# Patient Record
Sex: MALE | Race: WHITE | NOT HISPANIC OR LATINO | Employment: FULL TIME | URBAN - METROPOLITAN AREA
[De-identification: names, ages, dates, MRNs, and addresses within clinical notes are randomized per-mention and may not be internally consistent; named-entity substitution may affect disease eponyms.]

---

## 2020-03-05 ENCOUNTER — HOSPITAL ENCOUNTER (EMERGENCY)
Facility: OTHER | Age: 35
Discharge: HOME | End: 2020-03-05
Attending: EMERGENCY MEDICINE
Payer: COMMERCIAL

## 2020-03-05 VITALS
OXYGEN SATURATION: 98 % | SYSTOLIC BLOOD PRESSURE: 108 MMHG | WEIGHT: 156 LBS | DIASTOLIC BLOOD PRESSURE: 51 MMHG | BODY MASS INDEX: 20.02 KG/M2 | HEIGHT: 74 IN | RESPIRATION RATE: 17 BRPM | TEMPERATURE: 98 F | HEART RATE: 54 BPM

## 2020-03-05 DIAGNOSIS — S62.326A CLOSED DISPLACED FRACTURE OF SHAFT OF FIFTH METACARPAL BONE OF RIGHT HAND, INITIAL ENCOUNTER: Primary | ICD-10-CM

## 2020-03-05 PROCEDURE — 29125 APPL SHORT ARM SPLINT STATIC: CPT | Mod: RT

## 2020-03-05 PROCEDURE — 99283 EMERGENCY DEPT VISIT LOW MDM: CPT | Mod: 25

## 2020-03-06 ENCOUNTER — TELEPHONE (OUTPATIENT)
Dept: ORTHOPEDICS | Facility: CLINIC | Age: 35
End: 2020-03-06

## 2020-03-06 NOTE — ED PROVIDER NOTES
"Encounter Date: 3/5/2020       History     Chief Complaint   Patient presents with    Hand Pain     RIGHT hand x3 weeks- reports had xray done and has fx. States he is here todat "to get a cast and to get it fixed".      Patient is a 35-year-old male who presents to the emergency department with hand pain. Patient states he injured his right hand approximately 3 weeks ago while smashing it between sheet metal.  He states he went to urgent care after this and was told he did not have a fracture.  He states he has had persistent pain. He states he return to urgent care today who diagnosed with a fracture of his 5th metacarpal.  He was sent here for a splint.  He denies numbness.    The history is provided by the patient.     Review of patient's allergies indicates:  No Known Allergies  History reviewed. No pertinent past medical history.  Past Surgical History:   Procedure Laterality Date    APPENDECTOMY       History reviewed. No pertinent family history.  Social History     Tobacco Use    Smoking status: Not on file   Substance Use Topics    Alcohol use: Not on file    Drug use: Not on file     Review of Systems   Constitutional: Negative for chills and fever.   Musculoskeletal: Positive for arthralgias and joint swelling.   Skin: Negative for color change and wound.   Allergic/Immunologic: Negative for immunocompromised state.   Neurological: Negative for tremors and numbness.       Physical Exam     Initial Vitals   BP Pulse Resp Temp SpO2   03/05/20 1842 03/05/20 1842 03/05/20 1842 03/05/20 1842 03/05/20 1843   (!) 121/57 (!) 56 16 98.5 °F (36.9 °C) 98 %      MAP       --                Physical Exam    Constitutional: Vital signs are normal. He is cooperative. No distress.   HENT:   Head: Normocephalic and atraumatic.   Eyes: Conjunctivae and EOM are normal.   Neck: Normal range of motion. Neck supple.   Cardiovascular: Intact distal pulses.   Pulses:       Radial pulses are 2+ on the right side. "   Pulmonary/Chest: No respiratory distress.   Musculoskeletal:   Tenderness along the 5th metacarpal with swelling to the base of the 5th metacarpal on the right hands.  Range of motion is preserved aside from slightly decreased movement to right 5th digit secondary to pain. No signs of tendon injury to the right hand.   Neurological: He is alert and oriented to person, place, and time. GCS eye subscore is 4. GCS verbal subscore is 5. GCS motor subscore is 6.   No signs of nerve injury to the right hand.   Skin: Skin is warm and dry. No rash noted.         ED Course   Splint Application  Date/Time: 3/5/2020 9:51 PM  Performed by: Zach Dick PA-C  Authorized by: Elly Gill MD   Location details: right hand  Splint type: ulnar gutter  Supplies used: Ortho-Glass  Post-procedure: The splinted body part was neurovascularly unchanged following the procedure.  Patient tolerance: Patient tolerated the procedure well with no immediate complications        Labs Reviewed - No data to display       Imaging Results          X-Ray Hand 3 view Right (Edited Result - FINAL)  Result time 03/05/20 21:36:18    Addendum 1 of 1 by Sandy Payan MD (03/05/20 21:36:18)      In the body of the report, dislocated should be changed to displaced.      Electronically signed by: Sandy Payan  Date:    03/05/2020  Time:    21:36               Final result by Sandy Payan MD (03/05/20 21:01:35)                 Impression:      Acute traumatic displaced fracture of the right 5th metacarpal.      Electronically signed by: Sandy Payan  Date:    03/05/2020  Time:    21:01             Narrative:    EXAMINATION:  THREE VIEWS OF THE RIGHT HAND    CLINICAL HISTORY:  hand injury;    TECHNIQUE:  AP, lateral, and oblique views of the right hand    COMPARISON:  None.    FINDINGS:  Three views of the right hand demonstrate acute fracture involving the head and distal shaft and neck of the 5th metacarpal.  The distal fragment  is dislocated laterally.                                 Medical Decision Making:   Initial Assessment:   Urgent evaluation of a 35 y.o. male presenting to the emergency department complaining of right hand injury 3 weeks ago. Patient is afebrile, nontoxic appearing and hemodynamically stable.  -limb is distally neurovascular intact. Suspect boxer fracture.  ED Management:  Hand x-ray reveals a fracture involving the head and distal shaft and neck of the 5th metacarpal which, distal fragment is displaced.  -patient was placed in ulnar gutter splint.  He is given information to follow up with hand surgery.  He had no other complaints today and was stable at discharge.    Other:   I have discussed this case with another health care provider.                                 Clinical Impression:     1. Closed displaced fracture of shaft of fifth metacarpal bone of right hand, initial encounter            ED Disposition Condition    Discharge Stable        ED Prescriptions     None        Follow-up Information     Follow up With Specialties Details Why Contact Info Additional Information    [facility], [place], [facility] by [facility] [address] Orthopedics Schedule an appointment as soon as possible for a visit   [address] [facility] [address], [address]  [facility] [id number] [phone] Hand [facility] - [facility], 9th Floor, [address] [place]    [facility] Orthopedic Surgery Schedule an appointment as soon as possible for a visit   [address] [facility] [address]  [phone]                                        Zach Dick PA-C  03/05/20 3287

## 2020-03-06 NOTE — TELEPHONE ENCOUNTER
Called patient in regard to phone message about an appointment. Offered him an appt with Tiara Pardo on 3/10/2020 at 8 am and he agreed to come in at that time. He verbalized understanding of time and location.

## 2020-03-06 NOTE — TELEPHONE ENCOUNTER
----- Message from Bhavani Mohan sent at 3/6/2020 10:50 AM CST -----  Contact: pt  Name of Who is Calling: Gene Warner      What is the request in detail: pt was see in ED for a broken finger. Pt would like to schedule an appointment to be seen in clinic. Please contact to further discuss and advise.       Can the clinic reply by [facility]: n       What Number to Call Back [facility] not [facility]: [phone]

## 2020-03-10 ENCOUNTER — OFFICE VISIT (OUTPATIENT)
Dept: ORTHOPEDICS | Facility: CLINIC | Age: 35
End: 2020-03-10
Payer: COMMERCIAL

## 2020-03-10 VITALS
HEART RATE: 56 BPM | SYSTOLIC BLOOD PRESSURE: 103 MMHG | DIASTOLIC BLOOD PRESSURE: 62 MMHG | HEIGHT: 74 IN | WEIGHT: 156 LBS | BODY MASS INDEX: 20.02 KG/M2

## 2020-03-10 DIAGNOSIS — S62.336A CLOSED DISPLACED FRACTURE OF NECK OF FIFTH METACARPAL BONE OF RIGHT HAND, INITIAL ENCOUNTER: Primary | ICD-10-CM

## 2020-03-10 PROCEDURE — 3008F PR BODY MASS INDEX (BMI) DOCUMENTED: ICD-10-PCS | Mod: CPTII,S$GLB,, | Performed by: PHYSICIAN ASSISTANT

## 2020-03-10 PROCEDURE — 3008F BODY MASS INDEX DOCD: CPT | Mod: CPTII,S$GLB,, | Performed by: PHYSICIAN ASSISTANT

## 2020-03-10 PROCEDURE — 99999 PR PBB SHADOW E&M-EST. PATIENT-LVL III: CPT | Mod: PBBFAC,,, | Performed by: PHYSICIAN ASSISTANT

## 2020-03-10 PROCEDURE — 99203 PR OFFICE/OUTPT VISIT, NEW, LEVL III, 30-44 MIN: ICD-10-PCS | Mod: 25,S$GLB,, | Performed by: PHYSICIAN ASSISTANT

## 2020-03-10 PROCEDURE — 99203 OFFICE O/P NEW LOW 30 MIN: CPT | Mod: 25,S$GLB,, | Performed by: PHYSICIAN ASSISTANT

## 2020-03-10 PROCEDURE — 99999 PR PBB SHADOW E&M-EST. PATIENT-LVL III: ICD-10-PCS | Mod: PBBFAC,,, | Performed by: PHYSICIAN ASSISTANT

## 2020-03-10 PROCEDURE — 29075 PR APPLY FOREARM CAST: ICD-10-PCS | Mod: RT,S$GLB,, | Performed by: PHYSICIAN ASSISTANT

## 2020-03-10 PROCEDURE — 29075 APPL CST ELBW FNGR SHORT ARM: CPT | Mod: RT,S$GLB,, | Performed by: PHYSICIAN ASSISTANT

## 2020-03-10 NOTE — PROGRESS NOTES
"Subjective:      Patient ID: Gene Warner is a 35 y.o. male.    Chief Complaint: Pain of the Right Hand      HPI  Gene Warner is an ambidextrous 35 y.o. male presenting today for evaluation right 5th metacarpal fracture.  There was a history of trauma, reports that he had the right hand smashed against a corner piece of sheet metal.  Injury occurred 3 weeks ago.  He presented to the ED on 3/5/2020, reports that he had gone to [facility] [address] after the injury on 2/18/2020 and underwent x-ray evaluation.  Per the patient he was given a soft wrap and sent home without the diagnosis of a fracture.  He reports that he continue to use the hand for the next 2 weeks.  He returned to urgent care on 3/5/2020, had repeat x-ray and was diagnosed with a 5th metacarpal fracture, and then went to the ED for splinting.  He reports decreased motion in the right small finger compared to left.  He also reports mild pain in the right hand.  He denies any finger numbness or tingling.  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       Review of patient's allergies indicates:  No Known Allergies      No current outpatient medications on file.     No current facility-administered medications for this visit.        History reviewed. No pertinent past medical history.    Past Surgical History:   Procedure Laterality Date    APPENDECTOMY           Review of Systems:  Review of Systems   Constitution: Negative for chills and fever.   Skin: Negative for rash and suspicious lesions.   Musculoskeletal:        See HPI   Neurological: Negative for dizziness, headaches, light-headedness, numbness and paresthesias.   Psychiatric/Behavioral: Negative for depression. The patient is not nervous/anxious.          OBJECTIVE:     PHYSICAL EXAM:  Height: 6' 2" (188 cm) Weight: 70.8 kg (156 lb)  Vitals:    03/10/20 0819   BP: 103/62   Pulse: (!) 56   Weight: 70.8 kg (156 lb)   Height: 6' 2" (1.88 m)   PainSc: " 0-No pain     General    Vitals reviewed.  Constitutional: He is oriented to person, place, and time. He appears well-developed and well-nourished.   HENT:   Head: Normocephalic and atraumatic.   Neck: Normal range of motion.   Cardiovascular: Normal rate.    Pulmonary/Chest: Effort normal. No respiratory distress.   Neurological: He is alert and oriented to person, place, and time.   Psychiatric: He has a normal mood and affect. His behavior is normal. Judgment and thought content normal.             Musculoskeletal:  No lacerations or abrasions.  No significant edema.  No ecchymosis.  Visible dirt under the fingernails bilaterally.  Mildly tender to palpation over the fracture site right 5th metacarpal head/neck.  Fair right small finger range of motion, mildly decreased due to pain.  Finger motion evaluated bilaterally, symmetrical rotation of the small finger toward the ring fingers.  No scissoring, no asymmetry.  Neurovascularly intact-good sensation and motor function, good capillary refill, 2+ radial pulses.    RADIOGRAPHS:  Right Hand XRay, 3/5/2020  FINDINGS:  Three views of the right hand demonstrate acute fracture involving the head and distal shaft and neck of the 5th metacarpal.  The distal fragment is dislocated laterally.      Impression     Acute traumatic displaced fracture of the right 5th metacarpal.     Comments: I have personally reviewed the imaging and I agree with the above radiologist's report. XRays from 3/5/2020 reviewed as well as outside XRay from 2/18/2020 which did show a 5th metacarpal fracture in similar position.    ASSESSMENT/PLAN:   Gene was seen today for pain.    Diagnoses and all orders for this visit:    Closed displaced fracture of neck of fifth metacarpal bone of right hand, initial encounter           - We talked at length about the anatomy and pathophysiology of   Encounter Diagnosis   Name Primary?    Closed displaced fracture of neck of fifth metacarpal bone of right  hand, initial encounter Yes       - x-rays reviewed with the patient, discussed that the fracture is now 3 weeks old and so has started to heal.  Discussed that CRPP would not be an option at this point, if we were to do surgical intervention it would require osteotomy with ORIF.   - x-rays and timeline reviewed with Dr. Hood, plan for conservative treatment and if necessary can revisit discussion of osteotomy and ORIF in the future if the fracture does not heal well with conservative management.  - ulnar gutter cast applied  - orders for OT, start in 3 weeks  - follow-up in 3 weeks with x-ray  - call with questions or concerns    Disclaimer: This note has been generated using voice-recognition software. There may be typographical errors that have been missed during proof-reading.

## 2020-03-23 ENCOUNTER — TELEPHONE (OUTPATIENT)
Dept: ORTHOPEDICS | Facility: CLINIC | Age: 35
End: 2020-03-23

## 2020-03-23 NOTE — TELEPHONE ENCOUNTER
----- Message from JAYLEEN Trejo sent at 3/23/2020 12:43 PM CDT -----  Regarding: cast  Please call patient - he left a message for Carly about his cast.  Sounds like we may need to add him on for a cast removal (move the 4/1 visit up to today or tomorrow  Thanks

## 2020-03-24 ENCOUNTER — OFFICE VISIT (OUTPATIENT)
Dept: ORTHOPEDICS | Facility: CLINIC | Age: 35
End: 2020-03-24
Payer: COMMERCIAL

## 2020-03-24 ENCOUNTER — HOSPITAL ENCOUNTER (OUTPATIENT)
Dept: RADIOLOGY | Facility: OTHER | Age: 35
Discharge: HOME | End: 2020-03-24
Attending: PHYSICIAN ASSISTANT
Payer: COMMERCIAL

## 2020-03-24 VITALS
DIASTOLIC BLOOD PRESSURE: 66 MMHG | BODY MASS INDEX: 20.02 KG/M2 | HEART RATE: 62 BPM | SYSTOLIC BLOOD PRESSURE: 105 MMHG | WEIGHT: 156 LBS | HEIGHT: 74 IN

## 2020-03-24 DIAGNOSIS — S62.336A CLOSED DISPLACED FRACTURE OF NECK OF FIFTH METACARPAL BONE OF RIGHT HAND, INITIAL ENCOUNTER: Primary | ICD-10-CM

## 2020-03-24 DIAGNOSIS — S62.336A CLOSED DISPLACED FRACTURE OF NECK OF FIFTH METACARPAL BONE OF RIGHT HAND, INITIAL ENCOUNTER: ICD-10-CM

## 2020-03-24 PROCEDURE — 99999 PR PBB SHADOW E&M-EST. PATIENT-LVL IV: ICD-10-PCS | Mod: PBBFAC,,, | Performed by: PHYSICIAN ASSISTANT

## 2020-03-24 PROCEDURE — 97760 ORTHOTIC MGMT&TRAING 1ST ENC: CPT | Mod: S$GLB,,, | Performed by: PHYSICIAN ASSISTANT

## 2020-03-24 PROCEDURE — 99213 PR OFFICE/OUTPT VISIT, EST, LEVL III, 20-29 MIN: ICD-10-PCS | Mod: S$GLB,,, | Performed by: PHYSICIAN ASSISTANT

## 2020-03-24 PROCEDURE — 99213 OFFICE O/P EST LOW 20 MIN: CPT | Mod: S$GLB,,, | Performed by: PHYSICIAN ASSISTANT

## 2020-03-24 PROCEDURE — 73130 X-RAY EXAM OF HAND: CPT | Mod: 26,RT,, | Performed by: RADIOLOGY

## 2020-03-24 PROCEDURE — 97760 PR ORTHOTIC MGMT&TRAINJ INITIAL ENC EA 15 MINS: ICD-10-PCS | Mod: S$GLB,,, | Performed by: PHYSICIAN ASSISTANT

## 2020-03-24 PROCEDURE — 99999 PR PBB SHADOW E&M-EST. PATIENT-LVL IV: CPT | Mod: PBBFAC,,, | Performed by: PHYSICIAN ASSISTANT

## 2020-03-24 PROCEDURE — 73130 X-RAY EXAM OF HAND: CPT | Mod: TC,FY,RT

## 2020-03-24 PROCEDURE — 3008F BODY MASS INDEX DOCD: CPT | Mod: CPTII,S$GLB,, | Performed by: PHYSICIAN ASSISTANT

## 2020-03-24 PROCEDURE — 73130 XR HAND COMPLETE 3 VIEW RIGHT: ICD-10-PCS | Mod: 26,RT,, | Performed by: RADIOLOGY

## 2020-03-24 PROCEDURE — 3008F PR BODY MASS INDEX (BMI) DOCUMENTED: ICD-10-PCS | Mod: CPTII,S$GLB,, | Performed by: PHYSICIAN ASSISTANT

## 2020-03-24 RX ORDER — TRAMADOL HYDROCHLORIDE 50 MG/1
50 TABLET ORAL EVERY 8 HOURS PRN
Qty: 21 TABLET | Refills: 0 | Status: SHIPPED | OUTPATIENT
Start: 2020-03-24

## 2020-03-24 NOTE — PATIENT INSTRUCTIONS
Full time use of the hand brace x 2 weeks  After that time the brace can come off for bathing and therapy only - continue full time use for sleep, activity, public, etc.  No lifting or weight bearing with the right hand  Start therapy in 2 weeks - call to schedule  Therapy will evaluate the hand and give you a home exercise program  Follow up in clinic with a repeat XRay in 4 weeks  Call with any questions or concerns

## 2020-03-24 NOTE — PROGRESS NOTES
"Subjective:      Patient ID: Gene Warner is a 35 y.o. male.    Chief Complaint: Injury of the Right Hand      HPI  Gene Warner is an ambidextrous 35 y.o. male presenting today for follow up of right 5th metacarpal fracture.  There was a history of trauma, reports that he had the right hand smashed against a corner piece of sheet metal.  Patient was initially seen in Hand Clinic 3 weeks after injury.  Due to the elapsed time since fracture we are treating non-operatively with immobilization. He was placed in a cast at his last visit, reports that he was using power tools over the past week and has had increased pain in the ulnar right hand since that time.  Denies weightbearing with the right hand.  Per cast tech the cast was not in good condition, it "looked like it had gotten wet." He reports moderate intermittent pain, reports that he is out of pain medication.  He presented to the ED on 3/5/2020, reports that he had gone to [facility] on [address] after the injury on 2/18/2020 and underwent x-ray evaluation.  Per the patient he was given a soft wrap and sent home without the diagnosis of a fracture.  He reports that he continued to use the hand for the next 2 weeks.  He returned to urgent care on 3/5/2020, had repeat x-ray and was diagnosed with a 5th metacarpal fracture, and then went to the ED for splinting.    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       Review of patient's allergies indicates:  No Known Allergies      Current Outpatient Medications   Medication Sig Dispense Refill    traMADoL (ULTRAM) 50 mg tablet Take 1 tablet (50 mg total) by mouth every 8 (eight) hours as needed for Pain. 21 tablet 0     No current facility-administered medications for this visit.        History reviewed. No pertinent past medical history.    Past Surgical History:   Procedure Laterality Date    APPENDECTOMY           Review of Systems:  Review of Systems   Constitution: " "Negative for chills and fever.   Skin: Negative for rash and suspicious lesions.   Musculoskeletal:        See HPI   Neurological: Negative for dizziness, headaches, light-headedness, numbness and paresthesias.   Psychiatric/Behavioral: Negative for depression. The patient is not nervous/anxious.          OBJECTIVE:     PHYSICAL EXAM:  Height: 6' 2" (188 cm) Weight: 70.8 kg (156 lb)  Vitals:    03/24/20 1331   BP: 105/66   Pulse: 62   Weight: 70.8 kg (156 lb)   Height: 6' 2" (1.88 m)   PainSc:   5     General    Vitals reviewed.  Constitutional: He is oriented to person, place, and time. He appears well-developed and well-nourished.   HENT:   Head: Normocephalic and atraumatic.   Neck: Normal range of motion.   Cardiovascular: Normal rate.    Pulmonary/Chest: Effort normal. No respiratory distress.   Neurological: He is alert and oriented to person, place, and time.   Psychiatric: He has a normal mood and affect. His behavior is normal. Judgment and thought content normal.             Musculoskeletal:  No lacerations or abrasions.  No significant edema.  No ecchymosis.  Continues to have visible dirt under the fingernails bilaterally, right small and ring finger without visible dirt.  Mildly tender to palpation over the fracture site right 5th metacarpal head/neck.  Fair right small finger range of motion, decreased due to pain.  Finger motion evaluated bilaterally, symmetrical rotation of the small finger toward the ring fingers.  No scissoring, no asymmetry.  Neurovascularly intact-good sensation and motor function, good capillary refill, 2+ radial pulses.    RADIOGRAPHS:  Right Hand XRay, 3/24/2020  FINDINGS:  There is a healing 5th metacarpal bone fracture there is mild radial displacement of the more distal fragment, unchanged from 03/05/2020.  The fracture line is still visible.  The remainder of the visualized osseous structures appear normal.  No advanced degenerative change.      Impression     Healing 5th " metacarpal bone fracture.     Comments: I have personally reviewed the imaging and I agree with the above radiologist's report. 5th metacarpal fracture in similar position.    ASSESSMENT/PLAN:   Gene was seen today for injury.    Diagnoses and all orders for this visit:    Closed displaced fracture of neck of fifth metacarpal bone of right hand, initial encounter  -     X-Ray Hand 3 View Right; Future    Other orders  -     traMADoL (ULTRAM) 50 mg tablet; Take 1 tablet (50 mg total) by mouth every 8 (eight) hours as needed for Pain.           - We talked at length about the anatomy and pathophysiology of   Encounter Diagnosis   Name Primary?    Closed displaced fracture of neck of fifth metacarpal bone of right hand, initial encounter Yes       - x-rays reviewed with the patient, reinforced no weight bearing or use of the right hand.   - Again, plan for conservative treatment and if necessary can revisit discussion of osteotomy and ORIF in the future if the fracture does not heal well with conservative management.  - ulnar gutter Exos brace IP position (15 minutes spent preparing, fitting, and educating on brace). Will wear full time x 2 weeks, then wean out for bathing and therapy.  - Start OT in 1-2 weeks  - Tramadol 50 mg 1 tab every 8 hours prn moderate to severe pain  - follow-up in 4 weeks with x-ray  - call with questions or concerns    Of note, this patient was treated during the COVID-19 pandemic    Disclaimer: This note has been generated using voice-recognition software. There may be typographical errors that have been missed during proof-reading.

## 2020-04-19 ENCOUNTER — HOSPITAL ENCOUNTER (EMERGENCY)
Facility: OTHER | Age: 35
Discharge: HOME | End: 2020-04-19
Attending: EMERGENCY MEDICINE
Payer: COMMERCIAL

## 2020-04-19 VITALS
SYSTOLIC BLOOD PRESSURE: 120 MMHG | OXYGEN SATURATION: 98 % | WEIGHT: 156 LBS | BODY MASS INDEX: 20.02 KG/M2 | HEART RATE: 70 BPM | TEMPERATURE: 98 F | RESPIRATION RATE: 18 BRPM | DIASTOLIC BLOOD PRESSURE: 64 MMHG | HEIGHT: 74 IN

## 2020-04-19 DIAGNOSIS — S69.92XA HAND INJURY, LEFT, INITIAL ENCOUNTER: ICD-10-CM

## 2020-04-19 DIAGNOSIS — S60.052A CONTUSION OF LEFT LITTLE FINGER WITHOUT DAMAGE TO NAIL, INITIAL ENCOUNTER: Primary | ICD-10-CM

## 2020-04-19 PROCEDURE — 25000003 PHARM REV CODE 250: Performed by: PHYSICIAN ASSISTANT

## 2020-04-19 PROCEDURE — 29130 APPL FINGER SPLINT STATIC: CPT | Mod: F4

## 2020-04-19 PROCEDURE — 99283 EMERGENCY DEPT VISIT LOW MDM: CPT | Mod: 25

## 2020-04-19 RX ADMIN — NEOMYCIN AND POLYMYXIN B SULFATES AND BACITRACIN ZINC: 400; 3.5; 5 OINTMENT TOPICAL at 03:04

## 2020-04-19 NOTE — ED PROVIDER NOTES
Encounter Date: 4/19/2020       History     Chief Complaint   Patient presents with    Hand Pain     pt had pecice of metal dropped on left hand today. pt  able to move fingers with pain.      Patient is a 35-year-old male who presents to the emergency department with left hand pain.  Patient states he had a crush injury.  Patient states a piece of metal dropped on his hand after the table collapsed.  He states he was able to free his hand immediately after.  Patient is reporting pain to the base of his ring and pinky finger.  He states there is an indentation to his ring finger.  He denies numbness.  He is up-to-date on his tetanus.  Patient is also requesting a hand x-ray of his right hand as he fractured this hand approximately 1 month ago and states it is still intermittently in pain.    The history is provided by the patient.     Review of patient's allergies indicates:  No Known Allergies  No past medical history on file.  Past Surgical History:   Procedure Laterality Date    APPENDECTOMY       No family history on file.  Social History     Tobacco Use    Smoking status: Not on file   Substance Use Topics    Alcohol use: Not on file    Drug use: Not on file     Review of Systems   Constitutional: Negative for chills and fever.   Musculoskeletal: Positive for arthralgias and joint swelling.   Skin: Positive for wound. Negative for color change.   Allergic/Immunologic: Negative for immunocompromised state.   Neurological: Negative for weakness and numbness.       Physical Exam     Initial Vitals [04/19/20 1422]   BP Pulse Resp Temp SpO2   123/66 73 18 98.3 °F (36.8 °C) 97 %      MAP       --         Physical Exam    Constitutional: Vital signs are normal. He is cooperative. No distress.   HENT:   Head: Normocephalic and atraumatic.   Eyes: Conjunctivae and EOM are normal.   Neck: Normal range of motion. Neck supple.   Cardiovascular:   Pulses:       Radial pulses are 2+ on the right side, and 2+ on the left  side.   Pulmonary/Chest: No respiratory distress.   Musculoskeletal:   Right hand: normal range of motion.  No obvious deformity.  Left hand:  Edema to the dorsal aspect overlying the 5th MCP with bony tenderness.  Fifth MCP has limited flexion, approximately 90°.  Edema to the 4th digit between the MCP and PIP joint with bony tenderness.  Full range of motion noted.   Neurological: He is alert and oriented to person, place, and time. No sensory deficit. GCS eye subscore is 4. GCS verbal subscore is 5. GCS motor subscore is 6.   Skin: Skin is warm and dry. Capillary refill takes less than 2 seconds. No rash noted.         ED Course   Splint Application  Date/Time: 4/19/2020 4:56 PM  Performed by: Zach Dick PA-C  Authorized by: Zeke Joseph MD   Location details: left small finger  Splint type: static finger  Supplies used: aluminum splint  Patient tolerance: Patient tolerated the procedure well with no immediate complications        Labs Reviewed - No data to display       Imaging Results          X-Ray Hand 3 View Bilateral (Final result)  Result time 04/19/20 16:05:36    Final result by Tiara Zepeda MD (04/19/20 16:05:36)                 Impression:      1. No acute fracture or other abnormality in the left hand  2. Deformity of the right 5th metacarpal consistent with healed fracture.  3. New lucencies noted in the right carpal bones, distal radius, and proximal 5th phalanx are favored to be related to disuse osteopenia in this patient with history of fairly recent right 5th metacarpal fracture.  Erosive arthritis could have a similar radiographic appearance but would be unexpected developed in this time frame.      Electronically signed by: Tiara Zepeda MD  Date:    04/19/2020  Time:    16:05             Narrative:    EXAMINATION:  XR HAND COMPLETE 3 VIEWS BILATERAL    CLINICAL HISTORY:  crush injury;.    TECHNIQUE:  PA, lateral, and oblique views of both hands were  performed.    COMPARISON:  Prior dated 03/24/2020    FINDINGS:  Right hand: There is stable deformity of the distal 5th metacarpal consistent with remote fracture.  There is marked periarticular osteopenia.  Possible small periarticular erosion at the base of the 5th proximal phalanx as well as new lucencies noted in the distal radius and carpal bones which are favored to be related to disuse osteopenia although erosions related to inflammatory arthritis could have a similar appearance, but would be unusual to develop in this rapid time fraying since most recent exam 3 weeks ago.  There is mild radiocarpal joint space narrowing.    Left hand: There is periarticular osteopenia.  Mild radiocarpal joint space narrowing.  No definite erosions are seen.                                 Medical Decision Making:   Initial Assessment:   Urgent evaluation of a 35 y.o. male presenting to the emergency department complaining of left hand pain after crush injury.. Patient is afebrile, nontoxic appearing and hemodynamically stable.  -there is no signs of compartment syndrome.  Obtain is distally neurovascular intact.  Do have some suspicion for tendon injury to the left 5th digit given patient's decreased range of motion.  Will obtain x-rays and reassess.  ED Management:  X-rays reveal no fracture or dislocation of the left hand.  There is a healing 5th metacarpal fracture to patient's right hand, consistent with history of recent fracture.  He does have new lucencies which may be related to disuse.  Patient states he was supposed to go to physical therapy but has not done so yet.  -patient is finger was placed and static splint.  He was given information follow up with his hand orthopedist given possible tendon injury after swelling improves..  He had no other complaints today and was stable at discharge.                                 Clinical Impression:     1. Contusion of left little finger without damage to nail, initial  encounter    2. Hand injury, left, initial encounter                               Zach Dick PA-C  04/19/20 1392

## 2020-04-20 ENCOUNTER — HOSPITAL ENCOUNTER (OUTPATIENT)
Dept: RADIOLOGY | Facility: HOSPITAL | Age: 35
Discharge: HOME | End: 2020-04-20
Attending: ORTHOPAEDIC SURGERY

## 2020-04-20 ENCOUNTER — OFFICE VISIT (OUTPATIENT)
Dept: ORTHOPEDICS | Facility: CLINIC | Age: 35
End: 2020-04-20
Payer: COMMERCIAL

## 2020-04-20 ENCOUNTER — TELEPHONE (OUTPATIENT)
Dept: ORTHOPEDICS | Facility: CLINIC | Age: 35
End: 2020-04-20

## 2020-04-20 VITALS
HEART RATE: 52 BPM | DIASTOLIC BLOOD PRESSURE: 72 MMHG | HEIGHT: 74 IN | SYSTOLIC BLOOD PRESSURE: 114 MMHG | WEIGHT: 156 LBS | BODY MASS INDEX: 20.02 KG/M2

## 2020-04-20 DIAGNOSIS — M79.642 BILATERAL HAND PAIN: Primary | ICD-10-CM

## 2020-04-20 DIAGNOSIS — M79.641 BILATERAL HAND PAIN: Primary | ICD-10-CM

## 2020-04-20 DIAGNOSIS — M79.642 BILATERAL HAND PAIN: ICD-10-CM

## 2020-04-20 DIAGNOSIS — M79.641 BILATERAL HAND PAIN: ICD-10-CM

## 2020-04-20 PROCEDURE — 99999 PR PBB SHADOW E&M-EST. PATIENT-LVL III: CPT | Mod: PBBFAC,,, | Performed by: ORTHOPAEDIC SURGERY

## 2020-04-20 PROCEDURE — 99213 OFFICE O/P EST LOW 20 MIN: CPT | Mod: PBBFAC | Performed by: ORTHOPAEDIC SURGERY

## 2020-04-20 PROCEDURE — 99214 PR OFFICE/OUTPT VISIT, EST, LEVL IV, 30-39 MIN: ICD-10-PCS | Mod: S$GLB,,, | Performed by: ORTHOPAEDIC SURGERY

## 2020-04-20 PROCEDURE — 99999 PR PBB SHADOW E&M-EST. PATIENT-LVL III: ICD-10-PCS | Mod: PBBFAC,,, | Performed by: ORTHOPAEDIC SURGERY

## 2020-04-20 PROCEDURE — 99214 OFFICE O/P EST MOD 30 MIN: CPT | Mod: S$GLB,,, | Performed by: ORTHOPAEDIC SURGERY

## 2020-04-20 NOTE — PROGRESS NOTES
"Subjective:      Patient ID: Gene Warner is a 35 y.o. male.    Chief Complaint: Pain of the Right Hand and Pain of the Left Hand      HPI  04/20/2020  Gene Warner is a 35 y.o. male returns for right 5th MC fracture follow up and new left ring and small finger evaluation. He was seen at [facility] 04/19/20 for left hand injury after a piece of metal crushed his hand while moving a table. He is concerned about a possible fracture or tendon injury.       03/24/20  Gene Warner is an ambidextrous 35 y.o. male presenting today for follow up of right 5th metacarpal fracture.  There was a history of trauma, reports that he had the right hand smashed against a corner piece of sheet metal.  Patient was initially seen in [facility] 3 weeks after injury.  Due to the elapsed time since fracture we are treating non-operatively with immobilization. He was placed in a cast at his last visit, reports that he was using power tools over the past week and has had increased pain in the ulnar right hand since that time.  Denies weightbearing with the right hand.  Per cast tech the cast was not in good condition, it "looked like it had gotten wet." He reports moderate intermittent pain, reports that he is out of pain medication.  He presented to the ED on 3/5/2020, reports that he had gone to [facility] on [address] after the injury on 2/18/2020 and underwent x-ray evaluation.  Per the patient he was given a soft wrap and sent home without the diagnosis of a fracture.  He reports that he continued to use the hand for the next 2 weeks.  He returned to urgent care on 3/5/2020, had repeat x-ray and was diagnosed with a 5th metacarpal fracture, and then went to the ED for splinting.    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       Review of patient's allergies indicates:  No Known Allergies      Current Outpatient Medications   Medication Sig Dispense Refill    traMADoL (ULTRAM) 50 mg tablet " "Take 1 tablet (50 mg total) by mouth every 8 (eight) hours as needed for Pain. (Patient not taking: Reported on 4/20/2020) 21 tablet 0     No current facility-administered medications for this visit.        History reviewed. No pertinent past medical history.    Past Surgical History:   Procedure Laterality Date    APPENDECTOMY           Review of Systems:  Review of Systems   Constitution: Negative for chills and fever.   Skin: Negative for rash and suspicious lesions.   Musculoskeletal:        See HPI   Neurological: Negative for dizziness, headaches, light-headedness, numbness and paresthesias.   Psychiatric/Behavioral: Negative for depression. The patient is not nervous/anxious.          OBJECTIVE:     PHYSICAL EXAM:  Height: 6' 2" (188 cm) Weight: 70.8 kg (156 lb)  Vitals:    04/20/20 1529   BP: 114/72   Pulse: (!) 52   Weight: 70.8 kg (156 lb)   Height: 6' 2" (1.88 m)   PainSc:   2     General    Vitals reviewed.  Constitutional: He is oriented to person, place, and time. He appears well-developed and well-nourished.   HENT:   Head: Normocephalic and atraumatic.   Neck: Normal range of motion.   Cardiovascular: Normal rate.    Pulmonary/Chest: Effort normal. No respiratory distress.   Neurological: He is alert and oriented to person, place, and time.   Psychiatric: He has a normal mood and affect. His behavior is normal. Judgment and thought content normal.             Musculoskeletal:  Left hand lacerations or abrasions.  No significant edema.  Ecchymosis at P1 of small finger. TTP at P1. No evidence of extensor or flexor rupture. Unable to make a composite fist on left side. Continues to have visible dirt on hands, forearms, and  under the fingernails bilaterally. Mildly tender to palpation over the fracture site right 5th metacarpal head/neck.  Fair right small finger range of motion, decreased due to pain.  Finger motion evaluated bilaterally, symmetrical rotation of the small finger toward the ring " fingers.  No scissoring, no asymmetry.  Neurovascularly intact-good sensation and motor function, good capillary refill, 2+ radial pulses.    RADIOGRAPHS:  Right Hand XRay, 3/24/2020  FINDINGS:  There is a healing 5th metacarpal bone fracture there is mild radial displacement of the more distal fragment, unchanged from 03/05/2020.  The fracture line is still visible.  The remainder of the visualized osseous structures appear normal.  No advanced degenerative change.      Impression     Healing 5th metacarpal bone fracture.     Comments: I have personally reviewed the imaging and I agree with the above radiologist's report. 5th metacarpal fracture in similar position.    ASSESSMENT/PLAN:   Gene was seen today for pain and pain.    Diagnoses and all orders for this visit:    Bilateral hand pain  -     X-Ray Hand 3 View Bilateral; Future           - We talked at length about the anatomy and pathophysiology of   Encounter Diagnosis   Name Primary?    Bilateral hand pain Yes       - x-rays reviewed with the patient, reinforced no weight bearing or use of the right hand.   - Again, plan for conservative treatment and if necessary can revisit discussion of osteotomy and ORIF in the future if the fracture does not heal well with conservative management.  - ulnar gutter Exos brace IP position (15 minutes spent preparing, fitting, and educating on brace). Will wear full time x 2 weeks, then wean out for bathing and therapy.  - Start OT in 1-2 weeks  - Tramadol 50 mg 1 tab every 8 hours prn moderate to severe pain  - follow-up in 4 weeks with x-ray  - call with questions or concerns    Of note, this patient was treated during the COVID-19 pandemic    Disclaimer: This note has been generated using voice-recognition software. There may be typographical errors that have been missed during proof-reading.

## 2020-04-20 NOTE — TELEPHONE ENCOUNTER
Spoke to patient and scheduled him to come in today I asked him to please bring his identification and insurance card.----- Message from Carmela Rae sent at 4/20/2020  1:06 PM CDT -----  Contact: YOLANDA COKER [[id number]]  (.cctimesensitive)  Name of Caller:YOLANDA COKER [[id number]]    Reason for Visit/Symptoms: right broken hand, left handruptured tendon and possible fracture     Best Contact Number: [phone]    [facility] preferred: N    Preferred Date/Time of Appointment: Today     Interested in Virtual Visit: N    Additional Information: N/A

## 2020-05-12 ENCOUNTER — TELEPHONE (OUTPATIENT)
Dept: ORTHOPEDICS | Facility: CLINIC | Age: 35
End: 2020-05-12

## 2020-05-19 ENCOUNTER — TELEPHONE (OUTPATIENT)
Dept: ORTHOPEDICS | Facility: CLINIC | Age: 35
End: 2020-05-19

## 2020-05-19 NOTE — TELEPHONE ENCOUNTER
Spoke with pt informing him of appt and xray 5/20/2020.  Pt was advised to bring insurance card to visit.

## 2020-05-20 ENCOUNTER — HOSPITAL ENCOUNTER (OUTPATIENT)
Dept: RADIOLOGY | Facility: OTHER | Age: 35
Discharge: HOME | End: 2020-05-20
Attending: ORTHOPAEDIC SURGERY
Payer: COMMERCIAL

## 2020-05-20 ENCOUNTER — OFFICE VISIT (OUTPATIENT)
Dept: ORTHOPEDICS | Facility: CLINIC | Age: 35
End: 2020-05-20
Payer: COMMERCIAL

## 2020-05-20 VITALS
WEIGHT: 156 LBS | SYSTOLIC BLOOD PRESSURE: 116 MMHG | BODY MASS INDEX: 20.02 KG/M2 | DIASTOLIC BLOOD PRESSURE: 77 MMHG | HEART RATE: 78 BPM | HEIGHT: 74 IN

## 2020-05-20 DIAGNOSIS — S62.336A CLOSED DISPLACED FRACTURE OF NECK OF FIFTH METACARPAL BONE OF RIGHT HAND, INITIAL ENCOUNTER: Primary | ICD-10-CM

## 2020-05-20 DIAGNOSIS — M79.641 BILATERAL HAND PAIN: ICD-10-CM

## 2020-05-20 DIAGNOSIS — M79.642 BILATERAL HAND PAIN: ICD-10-CM

## 2020-05-20 PROCEDURE — 73130 XR HAND COMPLETE 3 VIEWS BILATERAL: ICD-10-PCS | Mod: 26,,, | Performed by: RADIOLOGY

## 2020-05-20 PROCEDURE — 99213 OFFICE O/P EST LOW 20 MIN: CPT | Mod: S$GLB,,, | Performed by: PHYSICIAN ASSISTANT

## 2020-05-20 PROCEDURE — 99999 PR PBB SHADOW E&M-EST. PATIENT-LVL III: CPT | Mod: PBBFAC,,, | Performed by: PHYSICIAN ASSISTANT

## 2020-05-20 PROCEDURE — 73130 X-RAY EXAM OF HAND: CPT | Mod: TC,50,FY

## 2020-05-20 PROCEDURE — 3008F PR BODY MASS INDEX (BMI) DOCUMENTED: ICD-10-PCS | Mod: CPTII,S$GLB,, | Performed by: PHYSICIAN ASSISTANT

## 2020-05-20 PROCEDURE — 73130 X-RAY EXAM OF HAND: CPT | Mod: 26,,, | Performed by: RADIOLOGY

## 2020-05-20 PROCEDURE — 99999 PR PBB SHADOW E&M-EST. PATIENT-LVL III: ICD-10-PCS | Mod: PBBFAC,,, | Performed by: PHYSICIAN ASSISTANT

## 2020-05-20 PROCEDURE — 99213 PR OFFICE/OUTPT VISIT, EST, LEVL III, 20-29 MIN: ICD-10-PCS | Mod: S$GLB,,, | Performed by: PHYSICIAN ASSISTANT

## 2020-05-20 PROCEDURE — 3008F BODY MASS INDEX DOCD: CPT | Mod: CPTII,S$GLB,, | Performed by: PHYSICIAN ASSISTANT

## 2020-05-20 NOTE — PROGRESS NOTES
Subjective:      Patient ID: Gene Warner is a 35 y.o. male.    Chief Complaint: Pain of the Right Hand      HPI  05/20/2020  Gene Warner is a 35 y.o. male returns for right 5th MC fracture follow up.  He has not attended OT as ordered at his last visit, he has discontinued use of the brace.  He is concerned about his ability to make a full fist with the right hand.  Denies any left hand complaints.  At his last visit 4/20/2020 he was also seen for new left ring and small finger evaluation. He was seen at [facility] 04/19/20 for left hand injury after a piece of metal crushed his hand while moving a table.      03/24/20  Gene Warner is an ambidextrous 35 y.o. male presenting today for follow up of right 5th metacarpal fracture.  There was a history of trauma, reports that he had the right hand smashed against a corner piece of sheet metal.  Patient was initially seen in Hand Clinic 3 weeks after injury.  Due to the elapsed time since fracture the decision was made to treat non-operatively with immobilization. He was immobilized in a UG cast then transitioned to an Exos brace.    He presented to the ED on 3/5/2020, reports that he had gone to [facility] [address] on [address] after the injury on 2/18/2020 and underwent x-ray evaluation.  Per the patient he was given a soft wrap and sent home without the diagnosis of a fracture.  He reports that he continued to use the hand for the next 2 weeks.  He returned to urgent care on 3/5/2020, had repeat x-ray, and was diagnosed with a 5th metacarpal fracture. He then went to the ED for splinting.    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       Review of patient's allergies indicates:  No Known Allergies      Current Outpatient Medications   Medication Sig Dispense Refill    traMADoL (ULTRAM) 50 mg tablet Take 1 tablet (50 mg total) by mouth every 8 (eight) hours as needed for Pain. (Patient not taking: Reported on 4/20/2020) 21 tablet  "0     No current facility-administered medications for this visit.        History reviewed. No pertinent past medical history.    Past Surgical History:   Procedure Laterality Date    APPENDECTOMY           Review of Systems:  Review of Systems   Constitution: Negative for chills and fever.   Skin: Negative for rash and suspicious lesions.   Musculoskeletal:        See HPI   Neurological: Negative for dizziness, headaches, light-headedness, numbness and paresthesias.   Psychiatric/Behavioral: Negative for depression. The patient is not nervous/anxious.          OBJECTIVE:     PHYSICAL EXAM:  Height: 6' 2" (188 cm) Weight: 70.8 kg (156 lb)  Vitals:    05/20/20 1533   BP: 116/77   Pulse: 78   Weight: 70.8 kg (156 lb)   Height: 6' 2" (1.88 m)   PainSc:   5     General    Vitals reviewed.  Constitutional: He is oriented to person, place, and time. He appears well-developed and well-nourished.   HENT:   Head: Normocephalic and atraumatic.   Neck: Normal range of motion.   Cardiovascular: Normal rate.    Pulmonary/Chest: Effort normal. No respiratory distress.   Neurological: He is alert and oriented to person, place, and time.   Psychiatric: He has a normal mood and affect. His behavior is normal. Judgment and thought content normal.             Musculoskeletal:  Bilaterally hands are dirty with small healing cuts/scrapes.  Continues to have visible dirt on hands, forearms, and  under the fingernails bilaterally. Mild edema right hand ulnarly near the 5th MC head.  No ecchymosis, nontender to palpation.  Improving right small and ring finger range of motion, near full composite fist on the right.  Good finger motion on the left, full composite fist.  Finger motion evaluated bilaterally, symmetrical rotation of the small finger toward the ring fingers.  No scissoring, no asymmetry.  Neurovascularly intact-good sensation and motor function, good capillary refill, 2+ radial pulses.    RADIOGRAPHS:  Right Hand XRay, " 3/24/2020  FINDINGS:  There is a healing 5th metacarpal bone fracture there is mild radial displacement of the more distal fragment, unchanged from 03/05/2020.  The fracture line is still visible.  The remainder of the visualized osseous structures appear normal.  No advanced degenerative change.      Impression     Healing 5th metacarpal bone fracture.     Comments: I have personally reviewed the imaging and I agree with the above radiologist's report. 5th metacarpal fracture in similar position.    ASSESSMENT/PLAN:   Gene was seen today for pain.    Diagnoses and all orders for this visit:    Closed displaced fracture of neck of fifth metacarpal bone of right hand, initial encounter  -     Ambulatory referral/consult to Physical/Occupational Therapy           - We talked at length about the anatomy and pathophysiology of   Encounter Diagnosis   Name Primary?    Closed displaced fracture of neck of fifth metacarpal bone of right hand, initial encounter Yes       - x-rays reviewed with the patient, discussed healing of the 5th metacarpal fracture.  We discussed that due to his 5th metacarpal fracture that knuckle of the right finger will never look the same as it did prior to fracture.   - Again, plan for conservative treatment and if necessary can revisit discussion of osteotomy and ORIF in the future if the fracture does not heal well with conservative management.  - discussed the importance of occupational therapy to get a full return to right hand function and use  - he has discontinued use of the ulnar gutter Exos  - follow-up in 6 weeks if he still has concerns after completing therapy  - call with questions or concerns    Of note, this patient was treated during the COVID-19 pandemic    Disclaimer: This note has been generated using voice-recognition software. There may be typographical errors that have been missed during proof-reading.

## 2020-06-02 ENCOUNTER — CLINICAL SUPPORT (OUTPATIENT)
Dept: REHABILITATION | Facility: HOSPITAL | Age: 35
End: 2020-06-02
Payer: COMMERCIAL

## 2020-06-02 DIAGNOSIS — M25.441 SWELLING OF HAND JOINT, RIGHT: ICD-10-CM

## 2020-06-02 DIAGNOSIS — M25.60 RANGE OF MOTION DEFICIT: ICD-10-CM

## 2020-06-02 DIAGNOSIS — R29.898 DECREASED GRIP STRENGTH OF RIGHT HAND: ICD-10-CM

## 2020-06-02 DIAGNOSIS — M79.641 HAND PAIN, RIGHT: ICD-10-CM

## 2020-06-02 PROCEDURE — 97165 OT EVAL LOW COMPLEX 30 MIN: CPT

## 2020-06-02 PROCEDURE — 97110 THERAPEUTIC EXERCISES: CPT

## 2020-06-02 PROCEDURE — 97018 PARAFFIN BATH THERAPY: CPT | Mod: 59

## 2020-06-02 NOTE — PATIENT INSTRUCTIONS
"[facility] - OCCUPATIONAL THERAPY  HOME EXERCISE PROGRAM      Soak hand in hot water in fixed position for 5-10 min or use hot wet compress prior to exercises or for stiffness       Complete the following exercises with 10 repetitions each, 4-5 x/day.     DO FOLLOWING EXERCISES WITH AND WITHOUT MASSIMO TAPING:     AROM: Isolated MCP Flexion / Extension ("Wave")   Bend only your large, bottom knuckles. Hold 3 seconds. Keep the tips of your fingers straight. Straighten fingers.    AROM: Isolated IPJ Flexion / Extension ("Hook")  Bend only your middle and end knuckles. Hold 3 seconds.   Straighten your fingers.         AROM: Composite Flexion / Extension ("Full Fist")  Bend every joint in your hand into a fist. Hold 3 seconds. Straighten your fingers.       DO WITHOUT TAPING:   AROM: Composte Extension ("Finger Lifts")  PLACE AND HOLD:  Lift your finger off of the tableWITH OTHER HAND AND Hold FINGER UP FOR 3 seconds. Relax your finger.    Complete the following strengthening program 10 REPS EACH 1-2x/day.     hold 3-5 seconds, relax, repeat     "donut" for ring/small finger and thumb            MEL Soto, BENSON  Certified Hand Therapist  Occupational Therapist      Copyright © [facility]. All rights reserved.     "

## 2020-06-08 PROBLEM — R29.898 DECREASED GRIP STRENGTH OF RIGHT HAND: Status: ACTIVE | Noted: 2020-06-08

## 2020-06-08 PROBLEM — M79.641 HAND PAIN, RIGHT: Status: ACTIVE | Noted: 2020-06-08

## 2020-06-08 PROBLEM — M25.441 SWELLING OF HAND JOINT, RIGHT: Status: ACTIVE | Noted: 2020-06-08

## 2020-06-08 PROBLEM — M25.60 RANGE OF MOTION DEFICIT: Status: ACTIVE | Noted: 2020-06-08

## 2020-06-08 NOTE — PLAN OF CARE
"  [facility] Occupational Therapy  Initial Hand Evaluation     Date: 6/2/2020  Name: Gene Warner  Clinic Number: [id number]    Medical Diagnosis: R 5th MCP fracture, non op 3/5/2020   Therapy Diagnosis:   Encounter Diagnoses   Name Primary?    Hand pain, right     Decreased  strength of right hand     Range of motion deficit     Swelling of hand joint, right      Physician: Tiara Pardo PA    Physician Orders: eval and treat     Surgical Procedure and Date: n/a   Evaluation Date: 6/2/2020    Plan of Care Certification Period: 8/2/2020   Date of Return to MD: TBD     Visit # / Visits authorized: 1 / pending   Insurance Authorization Period Expiration: pending   FOTO- no     Time In:245 pm   Time Out: 330 pm   Total Billable Time: 45  minutes    Precautions:  Standard    Subjective     Involved Side: right   Dominant Side: Ambidextrous  Date of Onset: 3/5/2020   Mechanism of Injury: hit hand on piece of sheet metal   History of Current Condition: Patient reports he went to urgent care and was told it wasn't broken, then seen in hospital ED where fracture diagnosed and referred to [facility] and for OT as surgery not warranted at this time   Imaging:  See chart   Previous Therapy: none     Past Medical History/Physical Systems Review:   Gene Warner  has no past medical history on file.    Gene Warner  has a past surgical history that includes Appendectomy.    Gene has a current medication list which includes the following prescription(s): tramadol.    Review of patient's allergies indicates:  No Known Allergies     Patient's Goals for Therapy: "I need my hand back to normal- I need full mobility "      Pain:  Functional Pain Scale Rating 0-10:   2/10 on average  0/10 at best  5/10 at worst  Location: dorsal R hand   Description: Aching, Dull and stiffness  Aggravating Factors: movement   Easing Factors: rest    Occupation:   and computer " "   Working presently: self-employed  Duties: sheet metal work, tools, writing, typing     Functional Limitations/Social History:    Previous functional status includes: Independent with all ADLs.     Current FunctionalStatus   Home/Living environment : lives alone      Limitation of Functional Status as follows:   ADLs/IADLs:     - Feeding: IND      - Bathing/ Hygiene: IND     - Dressing/Grooming: IND     - Driving: limited  with right hand     - writing / typing limited R hand use for writing, typing     - /pinch limited mobility and strength for gripping, holding things, lifting, opening jars/bottles     - work activities: tool use, lifting metal sheets, writing, typing      Leisure: sheet metal work , tool use,         Objective     Observation/Appearance:  Boxer's fracture brace        Sensation:   Intact     Wound/Scar:   None       Edema. Measured in centimeters.   MP's 20.5 cm     Hand ROM. Measured in degrees.     Small finger   MP 0/63   PIP 0/100   DIP 0/80     GAINES 243          Manual Muscle Testing   FDP, FDS 3/5   EDM 3/5   DAB/PAD 3/5     Special Tests:  NT         Strength (Dyanmometer) and Pinch Strength (Pinch Gauge)  Measured in pounds and psi. Average of three trials.MP   6/2/2020 6/2/2020    RIGHT LEFT    Rung II 58  75    Key Pinch NT  NT   3pt Pinch NT  NT   2pt Pinch NT  NT      NO FOTO       Treatment     Treatment Time In: 305 PM   Treatment Time Out: 330 PM   Total Treatment time separate from Evaluation time:25 MIN     Gene received the following supervised modalities after being cleared for contradictions for 10 minutes:   -Patient received paraffin bath to RIGHT  hand(s) for 10 minutes to increase blood flow, circulation, pain management and for tissue elasticity prior to therex.     Gene received therapeutic exercises for 15 minutes including:  -blocking FDP, FDS, jeff tape for wave, hook, flat and full fist; digit ab/ad and "place and hold" for digit extension x " "10 reps each, 3-5 x daily   - red putty for gross , "donut" for composite digit extension, x 10 reps each, 1-2 x daily   - reviewed modality use for pain, stiffness, swelling   - provided coban for home jeff taping.     Home Exercise Program/Education:  Issued HEP (see patient instructions in EMR) and educated on  use of hot/cold modality use for pain, stiffness and edema management . Exercises were reviewed and Gene was able to demonstrate them prior to the end of the session.   Pt received a written copy of exercises to perform at home. Gene demonstrated good  understanding of the education provided.  Pt was advised to perform these exercises with minimal pain/discomfort of 3-4 out of 10 at worse, discontinue if pain worsens.    Patient/Family Education: role of OT, goals for OT, scheduling/cancellations - pt verbalized understanding. Discussed insurance limitations with patient.    Additional Education provided: per above    Assessment     Gene Warner is a 35 y.o. year old referred to outpatient occupational therapy and presents with a medical diagnosis of R 5th MCP fracture, non op , resulting in Decreased ROM, Decreased  strength, Decreased muscle strength, Decreased functional hand use, Increased pain, Edema and Joint Stiffness and demonstrates limitations as described in the chart below.   Following medical record review it is determined that pt will benefit from occupational therapy services in order to maximize pain free and/or functional use of right hand/UE   The following goals were discussed with the patient and patient is in agreement with them as to be addressed in the treatment plan. The patient's rehab potential is Good.     Anticipated barriers to occupational therapy: None   Pt has no cultural, educational or language barriers to learning provided.    Profile and History Assessment of Occupational Performance Level of Clinical Decision Making Complexity Score   Occupational " Profile:   Gene Warner is a 35 y.o. male who lives alone and is currently employed as  and . Gene Warner has difficulty with    driving/transportation management, phone/computer use, housework/household chores and tool use, gripping, holding things, opening containers, lifting metal sheets.   affecting his/her daily functional abilities. His/her main goal for therapy is regain full mobility for work activities .     Comorbidities:   unremarkable     Medical and Therapy History Review:   Brief               Performance Deficits    Physical:  Joint Mobility  Joint Stability  Muscle Power/Strength  Muscle Endurance  Edema   Strength  Gross Motor Coordination  Pain    Cognitive:  No Deficits    Psychosocial:    Habits  Routines     Clinical Decision Making:  low    Assessment Process:  Problem-Focused Assessments    Modification/Need for Assistance:  Not Necessary    Intervention Selection:  Limited Treatment Options       Low   Based on PMHX, co morbidities , data from assessments and functional level of assistance required with task and clinical presentation directly impacting function.       The following goals were discussed with the patient and patient is in agreement with them as to be addressed in the treatment plan.       Goals:   Short Term Goals (4 weeks)   1)  Patient to be IND with HEP and modalities for pain management  2)  Increase TOTAL AROM 3-10 degrees to increase functional hand use for ADLs/work/leisure activities  3)   Decrease edema .1-.5 mm to increase joint mobility /flexibility for functional hand use.       Long Term Goals (8 weeks)   1)  Increase  strength 2-8 lbs. For TOOL USE, LIFTING SHEET METAL   2)   Increase TOTAL AROM 11-20 degrees to increase functional hand use for ADLs/work/leisure activities    Plan   Recommend continued Outpatient Occupataional Therapy  1x week for 8 weeks to include the following interventions Paraffin,  Manual therapy/joint mobilizations, Modalities for pain management, US 3 mhz, Therapeutic exercises/activities., Strengthening, Edema Control, Scar Management, Wound Care and Electrical Modalities.    Patient to try HEP and return to therapy as needed by expiration of current POC.      Within the Certification Period/Plan of care expiration: 6/2/2020 to 8/2/2020.    I certify the need for these services furnished under the plan of treatment and while under my care.     ____________________________________________________________________  Physician/Referring Practitioner   Date of Signature         Carly Xie OT, CHT